# Patient Record
Sex: MALE | Race: WHITE | Employment: FULL TIME | URBAN - METROPOLITAN AREA
[De-identification: names, ages, dates, MRNs, and addresses within clinical notes are randomized per-mention and may not be internally consistent; named-entity substitution may affect disease eponyms.]

---

## 2017-01-17 NOTE — PROGRESS NOTES
HPI:   Bren Delgadillo is a 32year old male who presents for upper respiratory symptoms for  2  months. Patient reports congestion, nauseated, MARY, and sinus pressure that come and go. Patient denies rhinorrhea, fever, fatigue.   Treatments tried: nothing- ha normocephalic, TM's pearly gray with light reflex, oropharynx without erythema, exudates or tonsillar hypertrophy  NECK: supple, no adenopathy, FROM  LUNGS: clear to auscultation b/l  CARDIO: RRR without murmur rub or gallop  GI: soft, non-distended and no

## 2017-01-17 NOTE — PATIENT INSTRUCTIONS
-start claritin once daily; if no improvement in sinuses within 3 days, start flonase nasal spray once daily  -continue lexapro  -cut out all dairy for at least 2 weeks.  Increase fiber in your diet and drink plenty of water.     -apply cream twice daily to

## 2017-07-31 ENCOUNTER — OFFICE VISIT (OUTPATIENT)
Dept: INTERNAL MEDICINE CLINIC | Facility: CLINIC | Age: 27
End: 2017-07-31

## 2017-07-31 VITALS
WEIGHT: 250 LBS | HEART RATE: 98 BPM | TEMPERATURE: 99 F | RESPIRATION RATE: 16 BRPM | OXYGEN SATURATION: 99 % | HEIGHT: 70 IN | DIASTOLIC BLOOD PRESSURE: 88 MMHG | SYSTOLIC BLOOD PRESSURE: 130 MMHG | BODY MASS INDEX: 35.79 KG/M2

## 2017-07-31 DIAGNOSIS — J02.9 SORE THROAT: ICD-10-CM

## 2017-07-31 DIAGNOSIS — J02.9 ACUTE PHARYNGITIS, UNSPECIFIED ETIOLOGY: Primary | ICD-10-CM

## 2017-07-31 DIAGNOSIS — F41.1 ANXIETY STATE: ICD-10-CM

## 2017-07-31 DIAGNOSIS — F34.1 DYSTHYMIC DISORDER: ICD-10-CM

## 2017-07-31 PROBLEM — Z87.891 FORMER SMOKER: Status: ACTIVE | Noted: 2017-07-31

## 2017-07-31 LAB — CONTROL LINE PRESENT WITH A CLEAR BACKGROUND (YES/NO): YES YES/NO

## 2017-07-31 PROCEDURE — 87880 STREP A ASSAY W/OPTIC: CPT | Performed by: PHYSICIAN ASSISTANT

## 2017-07-31 PROCEDURE — 87147 CULTURE TYPE IMMUNOLOGIC: CPT | Performed by: PHYSICIAN ASSISTANT

## 2017-07-31 PROCEDURE — 87081 CULTURE SCREEN ONLY: CPT | Performed by: PHYSICIAN ASSISTANT

## 2017-07-31 PROCEDURE — 99214 OFFICE O/P EST MOD 30 MIN: CPT | Performed by: PHYSICIAN ASSISTANT

## 2017-07-31 RX ORDER — ESCITALOPRAM OXALATE 10 MG/1
TABLET ORAL
Qty: 90 TABLET | Refills: 1 | Status: SHIPPED | OUTPATIENT
Start: 2017-07-31 | End: 2018-09-18

## 2017-07-31 RX ORDER — AMOXICILLIN 875 MG/1
875 TABLET, COATED ORAL 2 TIMES DAILY
Qty: 20 TABLET | Refills: 0 | Status: SHIPPED | OUTPATIENT
Start: 2017-07-31 | End: 2017-08-16

## 2017-07-31 NOTE — PROGRESS NOTES
HPI:   Myranda Clark is a 32year old male who presents for upper respiratory symptoms for  3  days. Patient reports sore throat, postnasal drip, painful swallowing. Patient denies cough, congestion, fever. Treatments tried: advil 800mg.     Anxiety: feels non-distended and nontender, no CVA tenderness    ASSESSMENT AND PLAN:   Bren Delgadillo is a 32year old male who presents with Acute pharyngitis, unspecified etiology  (primary encounter diagnosis)  Sore throat  Dysthymic disorder  Anxiety state.      PLAN:

## 2017-07-31 NOTE — PATIENT INSTRUCTIONS
Continue current medications    Take antibiotic as directed until completely finished. Contact us if you experience any adverse reaction to the medication.

## 2017-08-03 ENCOUNTER — TELEPHONE (OUTPATIENT)
Dept: INTERNAL MEDICINE CLINIC | Facility: CLINIC | Age: 27
End: 2017-08-03

## 2017-08-03 NOTE — TELEPHONE ENCOUNTER
Pt did not return to work this week. He will return on Monday. Needs new note. Call when ready for . [id number] [address]. Note complete and ready for . Pt aware.

## 2017-08-16 ENCOUNTER — OFFICE VISIT (OUTPATIENT)
Dept: INTERNAL MEDICINE CLINIC | Facility: CLINIC | Age: 27
End: 2017-08-16

## 2017-08-16 VITALS
DIASTOLIC BLOOD PRESSURE: 86 MMHG | WEIGHT: 250 LBS | HEART RATE: 98 BPM | HEIGHT: 70 IN | BODY MASS INDEX: 35.79 KG/M2 | OXYGEN SATURATION: 98 % | TEMPERATURE: 99 F | RESPIRATION RATE: 16 BRPM | SYSTOLIC BLOOD PRESSURE: 120 MMHG

## 2017-08-16 DIAGNOSIS — J02.9 ACUTE PHARYNGITIS, UNSPECIFIED ETIOLOGY: Primary | ICD-10-CM

## 2017-08-16 PROCEDURE — 87081 CULTURE SCREEN ONLY: CPT | Performed by: PHYSICIAN ASSISTANT

## 2017-08-16 PROCEDURE — 99213 OFFICE O/P EST LOW 20 MIN: CPT | Performed by: PHYSICIAN ASSISTANT

## 2017-08-16 RX ORDER — OMEGA-3 FATTY ACIDS/FISH OIL 300-1000MG
CAPSULE ORAL
COMMUNITY

## 2017-08-16 RX ORDER — PREDNISONE 20 MG/1
20 TABLET ORAL DAILY
Qty: 5 TABLET | Refills: 0 | Status: SHIPPED | OUTPATIENT
Start: 2017-08-16 | End: 2017-08-23

## 2017-08-16 RX ORDER — CEFUROXIME AXETIL 250 MG/1
250 TABLET ORAL 2 TIMES DAILY
Qty: 20 TABLET | Refills: 0 | Status: SHIPPED | OUTPATIENT
Start: 2017-08-16 | End: 2019-08-14 | Stop reason: ALTCHOICE

## 2017-08-16 NOTE — PROGRESS NOTES
HPI:   Eben Jean is a 32year old male who presents for recurrent upper respiratory symptoms for  5  days. Patient reports temp up to 100.2, fatigue, malaise, throat pain, voice changes, swollen glands, productive cough in am, difficulty sleeping.  Bernardo tonsillar abscess seen, no exudates  NECK: supple, no adenopathy, FROM  LUNGS: clear to auscultation b/l  CARDIO: RRR without murmur rub or gallop  GI: soft, non-distended and nontender, no CVA tenderness    ASSESSMENT AND PLAN:   Javier Garcia is a 27 year

## 2017-08-16 NOTE — PATIENT INSTRUCTIONS
Take antibiotic as directed until completely finished. Contact us if you experience any adverse reaction to the medication.     We will call with culture results

## 2017-08-22 ENCOUNTER — TELEPHONE (OUTPATIENT)
Dept: INTERNAL MEDICINE CLINIC | Facility: CLINIC | Age: 27
End: 2017-08-22

## 2017-08-22 NOTE — TELEPHONE ENCOUNTER
Ok per Ashley Martell to extend work note. Pt will need to return tomorrow. D/w pt above he expressed understanding and would like note faxed to his attention @ [phone]. Note faxed.

## 2017-08-22 NOTE — TELEPHONE ENCOUNTER
Pt was seen last week and had a note for work, pt states he still didn't feel good yesterday and today so he would like a note with these 2 days added, call back

## 2017-09-29 ENCOUNTER — TELEPHONE (OUTPATIENT)
Dept: INTERNAL MEDICINE CLINIC | Facility: CLINIC | Age: 27
End: 2017-09-29

## 2017-09-29 ENCOUNTER — MED REC SCAN ONLY (OUTPATIENT)
Dept: INTERNAL MEDICINE CLINIC | Facility: CLINIC | Age: 27
End: 2017-09-29

## 2018-09-18 DIAGNOSIS — F34.1 DYSTHYMIC DISORDER: ICD-10-CM

## 2018-09-18 DIAGNOSIS — F41.1 ANXIETY STATE: ICD-10-CM

## 2018-09-18 RX ORDER — ESCITALOPRAM OXALATE 10 MG/1
TABLET ORAL
Qty: 30 TABLET | Refills: 0 | Status: SHIPPED | OUTPATIENT
Start: 2018-09-18 | End: 2019-08-14 | Stop reason: ALTCHOICE

## 2019-04-25 ENCOUNTER — TELEPHONE (OUTPATIENT)
Dept: INTERNAL MEDICINE CLINIC | Facility: CLINIC | Age: 29
End: 2019-04-25

## 2019-04-25 NOTE — TELEPHONE ENCOUNTER
Patient called and requested a standing desk for his workplace. He has back pain, and was told to call PCP to inquire about a standing desk. Please advise.

## 2019-08-14 ENCOUNTER — OFFICE VISIT (OUTPATIENT)
Dept: INTERNAL MEDICINE CLINIC | Facility: CLINIC | Age: 29
End: 2019-08-14
Payer: COMMERCIAL

## 2019-08-14 VITALS
BODY MASS INDEX: 34.65 KG/M2 | WEIGHT: 242 LBS | HEART RATE: 77 BPM | HEIGHT: 70 IN | DIASTOLIC BLOOD PRESSURE: 82 MMHG | TEMPERATURE: 99 F | SYSTOLIC BLOOD PRESSURE: 122 MMHG | RESPIRATION RATE: 16 BRPM

## 2019-08-14 DIAGNOSIS — F41.9 ANXIETY: ICD-10-CM

## 2019-08-14 DIAGNOSIS — K21.9 GASTROESOPHAGEAL REFLUX DISEASE, ESOPHAGITIS PRESENCE NOT SPECIFIED: ICD-10-CM

## 2019-08-14 DIAGNOSIS — Z01.89 ENCOUNTER FOR ROUTINE LABORATORY TESTING: ICD-10-CM

## 2019-08-14 DIAGNOSIS — R63.2 BINGE EATING: ICD-10-CM

## 2019-08-14 DIAGNOSIS — J02.9 SORE THROAT: Primary | ICD-10-CM

## 2019-08-14 DIAGNOSIS — F32.A DEPRESSION, UNSPECIFIED DEPRESSION TYPE: ICD-10-CM

## 2019-08-14 LAB
CONTROL LINE PRESENT WITH A CLEAR BACKGROUND (YES/NO): YES YES/NO
STREP GRP A CUL-SCR: NEGATIVE

## 2019-08-14 PROCEDURE — 99214 OFFICE O/P EST MOD 30 MIN: CPT | Performed by: PHYSICIAN ASSISTANT

## 2019-08-14 PROCEDURE — 87880 STREP A ASSAY W/OPTIC: CPT | Performed by: PHYSICIAN ASSISTANT

## 2019-08-14 RX ORDER — PANTOPRAZOLE SODIUM 40 MG/1
40 TABLET, DELAYED RELEASE ORAL
Qty: 30 TABLET | Refills: 0 | Status: SHIPPED | OUTPATIENT
Start: 2019-08-14 | End: 2019-09-13

## 2019-08-14 NOTE — PATIENT INSTRUCTIONS
Establish with therapist and psychiatrist   Have labs drawn, fasting 8-10 hours prior (water before is ok).    Start pantoprazole once daily before breakfast

## 2019-08-14 NOTE — PROGRESS NOTES
HPI:   Bernard Chirinos is a 34year old male who presents for upper respiratory symptoms for  3  days. Patient reports sore throat, hoarseness, dryness of throat, worse upon waking in am . Patient denies fever, chills, sob, cough, wheezing, gland swelling.   Annelise Bowers BMI 34.72 kg/m²   GENERAL: well developed, well nourished,in no apparent distress  SKIN: no rashes, no suspicious lesions  EYES: PERRLA, EOMI, conjunctiva are clear  HEENT: atraumatic, normocephalic, TM's pearly gray with light reflex, oropharynx without

## 2022-01-19 ENCOUNTER — TELEPHONE (OUTPATIENT)
Dept: INTERNAL MEDICINE CLINIC | Facility: CLINIC | Age: 32
End: 2022-01-19

## 2022-01-19 NOTE — TELEPHONE ENCOUNTER
Pt was sick for 2 weeks ago but still has lingering congestion, mucus, and still breathless when he is coughing stuff up    No Covid Test    Please call to advise treatment options.

## 2022-01-19 NOTE — TELEPHONE ENCOUNTER
Vaccinated: No  Influenza Vaccine: No  Covid Contacts: Yes   Travel: No  Symptom onset: 12/29  Covid Testing: No  Fever: Resolved  Cough: Yes productive at times clear   Shortness of Breath: No  Loss of taste/smell: No  Sore Throat: Resolved  Headache: no

## (undated) NOTE — LETTER
17        Patient Name: Alonzo Thompson    : 3/30/1990     To Whom it may concern: This letter has been written at the patient's request. The above patient was seen at the [facility] for treatment of a medical condition.      This pa

## (undated) NOTE — LETTER
[facility], [address], [address] [facility]  [address] [place] [address] [address]  [place]ico [id number]  [phone]             19    Patient: Bernarda Eargm  : 3/30/1990      To Whom It May Concern,      The above mentioned patient is under my medica

## (undated) NOTE — LETTER
17      Patient Name: Hermila Abdul    : 3/30/1990     To Whom it may concern: This letter has been written at the patient's request. The above patient was seen at the [facility] for treatment of a medical condition.      This lm

## (undated) NOTE — LETTER
Date: 8/4/2017    Patient Name: Ronaldo Fonseca          To Whom it may concern: This letter has been written at the patient's request. The above patient was seen at the [facility] for treatment of a medical condition.     This patient should

## (undated) NOTE — Clinical Note
2017    Patient Name: Foreign Pantoja    : 3/30/1990     To Whom it may concern: This letter has been written at the patient's request. The above patient was seen at the [facility] for treatment of a medical condition.      This lm

## (undated) NOTE — MR AVS SNAPSHOT
[address]  [address] [facility] [id number] [phone]               Thank you for choosing us for your health care visit with Sridevi Cid PA-C.   We are glad to serve you and happy to provide you with These medications were sent to [address] [place] [address], [address] [place] [address], [facility], [address]     Phone:  [phone]    - escitalopram 10 MG Tabs  - hydrocortisone 2.5 % Crea            Follow-up Instr Don’t forget strength training with weights and resistance Set goals and track your progress   You don’t need to join a gym. Home exercises work great.  Put more priority on exercise in your life                    Visit [facility] online at

## (undated) NOTE — LETTER
19    Patient Name: Lety Weldon    : 3/30/1990     To Whom it may concern: This letter has been written at the patient's request. The above patient was seen at the [facility] for treatment of a medical condition.      This patien

## (undated) NOTE — LETTER
Date: 8/22/2017    Patient Name: Jefferson Branch          To Whom it may concern: This letter has been written at the patient's request. The above patient was seen at the [facility] for treatment of a medical condition.     This patient should